# Patient Record
Sex: MALE | Race: WHITE | NOT HISPANIC OR LATINO | URBAN - METROPOLITAN AREA
[De-identification: names, ages, dates, MRNs, and addresses within clinical notes are randomized per-mention and may not be internally consistent; named-entity substitution may affect disease eponyms.]

---

## 2019-12-09 VITALS
WEIGHT: 216.05 LBS | SYSTOLIC BLOOD PRESSURE: 156 MMHG | RESPIRATION RATE: 20 BRPM | OXYGEN SATURATION: 100 % | HEIGHT: 67.5 IN | TEMPERATURE: 99 F | DIASTOLIC BLOOD PRESSURE: 76 MMHG | HEART RATE: 73 BPM

## 2019-12-09 RX ORDER — CHLORHEXIDINE GLUCONATE 213 G/1000ML
1 SOLUTION TOPICAL ONCE
Refills: 0 | Status: DISCONTINUED | OUTPATIENT
Start: 2019-12-11 | End: 2020-01-03

## 2019-12-09 NOTE — H&P ADULT - HISTORY OF PRESENT ILLNESS
skeleton    64 y o m former smoker with PMH of LBBB, HTN, HLD, obesity, IBS, prostate CA, SINDHU (on CPAP?) presented to her doctor's office c/o SANDOVAL with moderate activity. Pt denies CP, dizziness, syncope, LE edema, PND/orthopnea, palpitations, n/v, fever/chills, blood in the stool. In light of pt's risk factors, symptoms mentioned above, pt is now referred to [facility] for recommended cardiac cath with possible intervention. skeleton    64 y o m former smoker with PMH of LBBB, HTN, HLD, obesity, IBS, prostate CA, SINDHU (on CPAP?) presented to his  cardiologist Dr. Holder c/o LORI with moderate activity. Pt denies CP, dizziness, syncope, LE edema, PND/orthopnea, palpitations, n/v, fever/chills, blood in the stool. In light of pt's risk factors, symptoms mentioned above, pt is now referred to [facility] for recommended cardiac cath with possible intervention. skeleton    64 y o obese M, former smoker with PMHx of LBBB, HTN, HLD, obesity, IBS, prostate CA (dx _______, chemo/XRT), SINDHU (on CPAP?) presented to his  cardiologist Dr. Bond c/o SANDOVAL upon ambulation of ____ city blocks, resolving with rest, over past _____.  Pt denies CP, dizziness, diaphoresis, palpitations,  fatigue, syncope, LE edema, PND/orthopnea, n/v, abdominal pain,  fever/chills, blood in the stool. In light of pt's risk factors, symptoms mentioned above, pt is now referred to [facility] for recommended cardiac cath with possible intervention.     Stress ECHO 12/5/19: motion of septal wall dyskinetic at rest and unchanged after stress, motion of anterior wall dyskinetic at rest and unchanged after rest, LVEF 35% at baseline which was unchanged with stress, large amount of ischemia c/w multivessel disease, no e/o previous infarct.     In light of pt's risk factors, CCS Class ___ Anginal Equivalent Sx, abnormal stress ECHO, pt referred for cardiac cath with possible intervention to r/o underlying CAD. **ON CPAP FOR SINDHU    63 yo obese M, former smoker, daily drinker (drinks 18-24 oz of wine per night, last drink 12/10/19 PM) with PMHx of LBBB, HTN, HLD, newly dx ischemic cardiomyopathy (EF 35% by stress ECHO 12/6/19), non-obstructive CAD (last cath >12 years ago, pt unsure of hospital where cath was performed), prostate CA (dx 2009, s/p robotic radical prostatectomy), SINDHU (on CPAP) who presented presented to his  cardiologist Dr. Bond c/o SANDOVAL after moving heavy palates at work, resolving with rest, over past 1-2 years. Pt also endorses intermittent palpitations lasting few seconds before self-resolving. Furthermore, pt started feeling dizzy/foggy in 9/2019 but this coincided with CPAP malfunctioning and this has resolved since he replaced his CPAP.  Otherwise, pt feels in his USOH, walks 10,000 steps per day and no issues with stairs. Pt denies CP, diaphoresis, fatigue, syncope, PND/orthopnea, n/v, abdominal pain,  fever/chills, blood in the stool.     Stress ECHO 12/5/19: motion of septal wall dyskinetic at rest and unchanged after stress, motion of anterior wall dyskinetic at rest and unchanged after rest, LVEF 35% at baseline which was unchanged with stress, large amount of ischemia c/w multivessel disease, no e/o previous infarct.     In light of pt's risk factors, CCS Class II Anginal Equivalent Sx, abnormal stress ECHO, new decline in EF, pt referred for cardiac cath with possible intervention to r/o underlying CAD.

## 2019-12-09 NOTE — H&P ADULT - NSHPLABSRESULTS_GEN_ALL_CORE
15.5   7.37  )-----------( 159      ( 11 Dec 2019 12:15 )             45.4       12-11    140  |  104  |  17  ----------------------------<  102<H>  4.1   |  25  |  0.94    Ca    9.8      11 Dec 2019 12:15    TPro  7.8  /  Alb  4.7  /  TBili  0.8  /  DBili  <0.2  /  AST  24  /  ALT  30  /  AlkPhos  68  12-11      PT/INR - ( 11 Dec 2019 12:15 )   PT: 11.8 sec;   INR: 1.04          PTT - ( 11 Dec 2019 12:15 )  PTT:34.7 sec    CARDIAC MARKERS ( 11 Dec 2019 12:15 )  x     / x     / 190 U/L / x     / 3.9 ng/mL        EKG: NSR @ 71 BPM with frequent PVCs and LBBB

## 2019-12-09 NOTE — H&P ADULT - NSICDXPASTMEDICALHX_GEN_ALL_CORE_FT
PAST MEDICAL HISTORY:  HLD (hyperlipidemia)     HTN (hypertension)     LBBB (left bundle branch block)     Obesity     SINDHU (obstructive sleep apnea)     Prostate CA

## 2019-12-09 NOTE — H&P ADULT - ASSESSMENT
63 yo obese M, former smoker, daily drinker (drinks 18-24 oz of wine per night, last drink 12/10/19 PM) with PMHx of LBBB, HTN, HLD, newly dx ischemic cardiomyopathy (EF 35% by stress ECHO 12/6/19), non-obstructive CAD (last cath >12 years ago, pt unsure of hospital where cath was performed), prostate CA (dx 2009, s/p robotic radical prostatectomy), SINDHU (on CPAP) who presented presented to his  cardiologist Dr. Bond c/o SANDOVAL after moving heavy palates at work, resolving with rest, over past 1-2 years. In light of pt's risk factors, CCS Class II Anginal Equivalent Sx, abnormal stress ECHO, new decline in EF, pt referred for cardiac cath with possible intervention to r/o underlying CAD.     On CPAP at home for SINDHU, will need CPAP ordered if he stays    EF 35% with 1+ edema to B/L LE, 1/2 NS @ 30 cc/hr KVO on call to cath lab    Pt endorses taking ASA 81 mg PO QD (last dose 12/11/19) and endorses daily compliance. Will load with  gm PO x1 and Plavix 600 mg PO X1 pre-cath.     Daily drinker, last drink 12/10/19, drinks 18-24 oz wine daily    ASA III, Mallampati IV  Sedation Plan: Moderate  Patient is Suitable Candidate for Sedation: Yes  Risks & benefits of procedure and alternative therapy have been explained to the patient including but not limited to: allergic reaction, bleeding w/possible need for blood transfusion, infection, renal and vascular compromise, limb damage, arrhythmia, stroke, vessel dissection/perforation, Myocardial infarction, emergent CABG. Informed consent obtained and in chart

## 2019-12-11 ENCOUNTER — OUTPATIENT (OUTPATIENT)
Dept: OUTPATIENT SERVICES | Facility: HOSPITAL | Age: 64
LOS: 1 days | Discharge: HOME | End: 2019-12-11
Payer: COMMERCIAL

## 2019-12-11 LAB
ALBUMIN SERPL ELPH-MCNC: 4.7 G/DL — SIGNIFICANT CHANGE UP (ref 3.3–5)
ALP SERPL-CCNC: 68 U/L — SIGNIFICANT CHANGE UP (ref 40–120)
ALT FLD-CCNC: 30 U/L — SIGNIFICANT CHANGE UP (ref 10–45)
ANION GAP SERPL CALC-SCNC: 11 MMOL/L — SIGNIFICANT CHANGE UP (ref 5–17)
APTT BLD: 34.7 SEC — SIGNIFICANT CHANGE UP (ref 27.5–36.3)
AST SERPL-CCNC: 24 U/L — SIGNIFICANT CHANGE UP (ref 10–40)
BASOPHILS # BLD AUTO: 0.03 K/UL — SIGNIFICANT CHANGE UP (ref 0–0.2)
BASOPHILS NFR BLD AUTO: 0.4 % — SIGNIFICANT CHANGE UP (ref 0–2)
BILIRUB SERPL-MCNC: 0.8 MG/DL — SIGNIFICANT CHANGE UP (ref 0.2–1.2)
BUN SERPL-MCNC: 17 MG/DL — SIGNIFICANT CHANGE UP (ref 7–23)
CALCIUM SERPL-MCNC: 9.8 MG/DL — SIGNIFICANT CHANGE UP (ref 8.4–10.5)
CHLORIDE SERPL-SCNC: 104 MMOL/L — SIGNIFICANT CHANGE UP (ref 96–108)
CHOLEST SERPL-MCNC: 175 MG/DL — SIGNIFICANT CHANGE UP (ref 10–199)
CK MB CFR SERPL CALC: 3.9 NG/ML — SIGNIFICANT CHANGE UP (ref 0–6.7)
CK SERPL-CCNC: 190 U/L — SIGNIFICANT CHANGE UP (ref 30–200)
CO2 SERPL-SCNC: 25 MMOL/L — SIGNIFICANT CHANGE UP (ref 22–31)
CREAT SERPL-MCNC: 0.94 MG/DL — SIGNIFICANT CHANGE UP (ref 0.5–1.3)
CRP SERPL-MCNC: 0.69 MG/DL — HIGH (ref 0–0.4)
EOSINOPHIL # BLD AUTO: 0.09 K/UL — SIGNIFICANT CHANGE UP (ref 0–0.5)
EOSINOPHIL NFR BLD AUTO: 1.2 % — SIGNIFICANT CHANGE UP (ref 0–6)
GLUCOSE SERPL-MCNC: 102 MG/DL — HIGH (ref 70–99)
HBA1C BLD-MCNC: 5.1 % — SIGNIFICANT CHANGE UP (ref 4–5.6)
HCT VFR BLD CALC: 45.4 % — SIGNIFICANT CHANGE UP (ref 39–50)
HDLC SERPL-MCNC: 50 MG/DL — SIGNIFICANT CHANGE UP
HGB BLD-MCNC: 15.5 G/DL — SIGNIFICANT CHANGE UP (ref 13–17)
IMM GRANULOCYTES NFR BLD AUTO: 0.4 % — SIGNIFICANT CHANGE UP (ref 0–1.5)
INR BLD: 1.04 — SIGNIFICANT CHANGE UP (ref 0.88–1.16)
LIPID PNL WITH DIRECT LDL SERPL: 100 MG/DL — SIGNIFICANT CHANGE UP
LYMPHOCYTES # BLD AUTO: 1.44 K/UL — SIGNIFICANT CHANGE UP (ref 1–3.3)
LYMPHOCYTES # BLD AUTO: 19.5 % — SIGNIFICANT CHANGE UP (ref 13–44)
MCHC RBC-ENTMCNC: 31.3 PG — SIGNIFICANT CHANGE UP (ref 27–34)
MCHC RBC-ENTMCNC: 34.1 GM/DL — SIGNIFICANT CHANGE UP (ref 32–36)
MCV RBC AUTO: 91.7 FL — SIGNIFICANT CHANGE UP (ref 80–100)
MONOCYTES # BLD AUTO: 0.67 K/UL — SIGNIFICANT CHANGE UP (ref 0–0.9)
MONOCYTES NFR BLD AUTO: 9.1 % — SIGNIFICANT CHANGE UP (ref 2–14)
NEUTROPHILS # BLD AUTO: 5.11 K/UL — SIGNIFICANT CHANGE UP (ref 1.8–7.4)
NEUTROPHILS NFR BLD AUTO: 69.4 % — SIGNIFICANT CHANGE UP (ref 43–77)
NRBC # BLD: 0 /100 WBCS — SIGNIFICANT CHANGE UP (ref 0–0)
PLATELET # BLD AUTO: 159 K/UL — SIGNIFICANT CHANGE UP (ref 150–400)
POTASSIUM SERPL-MCNC: 4.1 MMOL/L — SIGNIFICANT CHANGE UP (ref 3.5–5.3)
POTASSIUM SERPL-SCNC: 4.1 MMOL/L — SIGNIFICANT CHANGE UP (ref 3.5–5.3)
PROT SERPL-MCNC: 7.8 G/DL — SIGNIFICANT CHANGE UP (ref 6–8.3)
PROTHROM AB SERPL-ACNC: 11.8 SEC — SIGNIFICANT CHANGE UP (ref 10–12.9)
RBC # BLD: 4.95 M/UL — SIGNIFICANT CHANGE UP (ref 4.2–5.8)
RBC # FLD: 13.2 % — SIGNIFICANT CHANGE UP (ref 10.3–14.5)
SODIUM SERPL-SCNC: 140 MMOL/L — SIGNIFICANT CHANGE UP (ref 135–145)
TOTAL CHOLESTEROL/HDL RATIO MEASUREMENT: 3.5 RATIO — SIGNIFICANT CHANGE UP (ref 3.4–9.6)
TRIGL SERPL-MCNC: 123 MG/DL — SIGNIFICANT CHANGE UP (ref 10–149)
WBC # BLD: 7.37 K/UL — SIGNIFICANT CHANGE UP (ref 3.8–10.5)
WBC # FLD AUTO: 7.37 K/UL — SIGNIFICANT CHANGE UP (ref 3.8–10.5)

## 2019-12-11 PROCEDURE — 80053 COMPREHEN METABOLIC PANEL: CPT

## 2019-12-11 PROCEDURE — C1769: CPT

## 2019-12-11 PROCEDURE — 86140 C-REACTIVE PROTEIN: CPT

## 2019-12-11 PROCEDURE — 93458 L HRT ARTERY/VENTRICLE ANGIO: CPT

## 2019-12-11 PROCEDURE — 80061 LIPID PANEL: CPT

## 2019-12-11 PROCEDURE — 82248 BILIRUBIN DIRECT: CPT

## 2019-12-11 PROCEDURE — 93010 ELECTROCARDIOGRAM REPORT: CPT

## 2019-12-11 PROCEDURE — C1887: CPT

## 2019-12-11 PROCEDURE — 82550 ASSAY OF CK (CPK): CPT

## 2019-12-11 PROCEDURE — 93458 L HRT ARTERY/VENTRICLE ANGIO: CPT | Mod: 26

## 2019-12-11 PROCEDURE — 85610 PROTHROMBIN TIME: CPT

## 2019-12-11 PROCEDURE — C1894: CPT

## 2019-12-11 PROCEDURE — 82553 CREATINE MB FRACTION: CPT

## 2019-12-11 PROCEDURE — 85025 COMPLETE CBC W/AUTO DIFF WBC: CPT

## 2019-12-11 PROCEDURE — 93005 ELECTROCARDIOGRAM TRACING: CPT

## 2019-12-11 PROCEDURE — 83036 HEMOGLOBIN GLYCOSYLATED A1C: CPT

## 2019-12-11 PROCEDURE — 85730 THROMBOPLASTIN TIME PARTIAL: CPT

## 2019-12-11 RX ORDER — CLOPIDOGREL BISULFATE 75 MG/1
600 TABLET, FILM COATED ORAL ONCE
Refills: 0 | Status: COMPLETED | OUTPATIENT
Start: 2019-12-11 | End: 2019-12-11

## 2019-12-11 RX ORDER — UBIDECARENONE 100 MG
1 CAPSULE ORAL
Qty: 0 | Refills: 0 | DISCHARGE

## 2019-12-11 RX ORDER — ASPIRIN/CALCIUM CARB/MAGNESIUM 324 MG
243 TABLET ORAL ONCE
Refills: 0 | Status: COMPLETED | OUTPATIENT
Start: 2019-12-11 | End: 2019-12-11

## 2019-12-11 RX ORDER — ASPIRIN/CALCIUM CARB/MAGNESIUM 324 MG
1 TABLET ORAL
Qty: 0 | Refills: 0 | DISCHARGE

## 2019-12-11 RX ORDER — OMEGA-3 ACID ETHYL ESTERS 1 G
1 CAPSULE ORAL
Qty: 0 | Refills: 0 | DISCHARGE

## 2019-12-11 RX ORDER — SODIUM CHLORIDE 9 MG/ML
500 INJECTION, SOLUTION INTRAVENOUS
Refills: 0 | Status: DISCONTINUED | OUTPATIENT
Start: 2019-12-11 | End: 2019-12-11

## 2019-12-11 RX ORDER — METOPROLOL TARTRATE 50 MG
1 TABLET ORAL
Qty: 0 | Refills: 0 | DISCHARGE

## 2019-12-11 RX ORDER — FENOFIBRATE,MICRONIZED 130 MG
1 CAPSULE ORAL
Qty: 0 | Refills: 0 | DISCHARGE

## 2019-12-11 RX ORDER — SIMVASTATIN 20 MG/1
1 TABLET, FILM COATED ORAL
Qty: 0 | Refills: 0 | DISCHARGE

## 2019-12-11 RX ORDER — AZELASTINE 137 UG/1
2 SPRAY, METERED NASAL
Qty: 0 | Refills: 0 | DISCHARGE

## 2019-12-11 RX ADMIN — CLOPIDOGREL BISULFATE 600 MILLIGRAM(S): 75 TABLET, FILM COATED ORAL at 13:30

## 2019-12-11 RX ADMIN — Medication 243 MILLIGRAM(S): at 13:31

## 2019-12-11 NOTE — PROGRESS NOTE ADULT - SUBJECTIVE AND OBJECTIVE BOX
CORONARY ANGIOGRAPHY  12/11/19    Indication: SANDOVAL, new onset heart failure reduced ejection fraction, abnormal stress echo    Conclusion  Frailty Index: 3    1. Coronary Angiography  LM: normal  LAD: large caliber vessel, normal  LCx: large caliber vessel, normal  RCA: dominant, normal    2. Left heart cath  LVEF 35%, severely reduced global hypokinesis, LVEDP elevated 20 mmHg  no AS, no MR     Recommendations  non-ischemic cardiomyopathy  d/c home  f/u with Dr. Yarbrough    Access: R radial, TR band

## 2019-12-11 NOTE — PROGRESS NOTE ADULT - SUBJECTIVE AND OBJECTIVE BOX
Interventional Cardiology PA SDA Discharge Note    Patient without complaints. Ambulated and voided without difficulties    Afebrile, VSS    Ext: Right Radial: no hematoma, no bleeding, dressing C/D/I    Pulses:    intact RAD to baseline     A/P:  65 yo obese M, former smoker, daily drinker (drinks 18-24 oz of wine per night, last drink 12/10/19 PM) with PMHx of LBBB, HTN, HLD, newly dx ischemic cardiomyopathy (EF 35% by stress ECHO 12/6/19), non-obstructive CAD (last cath >12 years ago, pt unsure of hospital where cath was performed), prostate CA (dx 2009, s/p robotic radical prostatectomy), SINDHU (on CPAP) who presented presented to his  cardiologist Dr. Bond c/o SANDOVAL after moving heavy palates at work, resolving with rest, over past 1-2 years. Pt also endorses intermittent palpitations lasting few seconds before self-resolving. Furthermore, pt started feeling dizzy/foggy in 9/2019 but this coincided with CPAP malfunctioning and this has resolved since he replaced his CPAP.  Otherwise, pt feels in his USOH, walks 10,000 steps per day and no issues with stairs. Pt denies CP, diaphoresis, fatigue, syncope, PND/orthopnea, n/v, abdominal pain,  fever/chills, blood in the stool. Stress ECHO 12/5/19: motion of septal wall dyskinetic at rest and unchanged after stress, motion of anterior wall dyskinetic at rest and unchanged after rest, LVEF 35% at baseline which was unchanged with stress, large amount of ischemia c/w multivessel disease, no e/o previous infarct. In light of pt's risk factors, CCS Class II Anginal Equivalent Sx, abnormal stress ECHO, new decline in EF, pt referred for cardiac cath with possible intervention to r/o underlying CAD.     S/p diagnostic cardiac cath (12/11/2019) with no evidence of obstructive disease, EF 35%, EDP 20 mmHg, and no evidence of AS or MR. Patient recommended to continue medical management.      1.	Stable for discharge as per attending Dr. Wong after pt voids, radial band removed, wrist stable and 30 minutes of ambulation.  2.	Follow-up with PMD/Cardiologist Dr. Bond in 1-2 weeks.  3.	Discharge forms signed and copies in chart.

## 2019-12-26 DIAGNOSIS — I20.0 UNSTABLE ANGINA: ICD-10-CM

## 2019-12-26 DIAGNOSIS — R06.02 SHORTNESS OF BREATH: ICD-10-CM

## 2019-12-26 DIAGNOSIS — I10 ESSENTIAL (PRIMARY) HYPERTENSION: ICD-10-CM

## 2019-12-26 DIAGNOSIS — R07.9 CHEST PAIN, UNSPECIFIED: ICD-10-CM

## 2019-12-26 DIAGNOSIS — I50.9 HEART FAILURE, UNSPECIFIED: ICD-10-CM

## 2019-12-26 DIAGNOSIS — R93.1 ABNORMAL FINDINGS ON DIAGNOSTIC IMAGING OF HEART AND CORONARY CIRCULATION: ICD-10-CM

## 2019-12-26 DIAGNOSIS — I42.9 CARDIOMYOPATHY, UNSPECIFIED: ICD-10-CM

## 2022-07-28 PROBLEM — Z00.00 ENCOUNTER FOR PREVENTIVE HEALTH EXAMINATION: Status: ACTIVE | Noted: 2022-07-28

## 2023-02-17 ENCOUNTER — NON-APPOINTMENT (OUTPATIENT)
Age: 68
End: 2023-02-17

## 2025-02-26 ENCOUNTER — NON-APPOINTMENT (OUTPATIENT)
Age: 70
End: 2025-02-26